# Patient Record
Sex: MALE
[De-identification: names, ages, dates, MRNs, and addresses within clinical notes are randomized per-mention and may not be internally consistent; named-entity substitution may affect disease eponyms.]

---

## 2019-09-10 ENCOUNTER — TRANSCRIPTION ENCOUNTER (OUTPATIENT)
Age: 44
End: 2019-09-10

## 2019-09-16 ENCOUNTER — EMERGENCY (EMERGENCY)
Facility: HOSPITAL | Age: 44
LOS: 1 days | Discharge: HOME | End: 2019-09-16
Attending: EMERGENCY MEDICINE
Payer: COMMERCIAL

## 2019-09-16 VITALS
WEIGHT: 195.11 LBS | HEART RATE: 84 BPM | HEIGHT: 70 IN | DIASTOLIC BLOOD PRESSURE: 81 MMHG | OXYGEN SATURATION: 98 % | TEMPERATURE: 98 F | RESPIRATION RATE: 18 BRPM | SYSTOLIC BLOOD PRESSURE: 164 MMHG

## 2019-09-16 PROBLEM — Z00.00 ENCOUNTER FOR PREVENTIVE HEALTH EXAMINATION: Status: ACTIVE | Noted: 2019-09-16

## 2019-09-16 PROCEDURE — 99283 EMERGENCY DEPT VISIT LOW MDM: CPT

## 2019-09-16 RX ORDER — IBUPROFEN 200 MG
600 TABLET ORAL ONCE
Refills: 0 | Status: COMPLETED | OUTPATIENT
Start: 2019-09-16 | End: 2019-09-16

## 2019-09-16 RX ORDER — LIDOCAINE 4 G/100G
1 CREAM TOPICAL ONCE
Refills: 0 | Status: COMPLETED | OUTPATIENT
Start: 2019-09-16 | End: 2019-09-16

## 2019-09-16 RX ORDER — DIAZEPAM 5 MG
5 TABLET ORAL ONCE
Refills: 0 | Status: DISCONTINUED | OUTPATIENT
Start: 2019-09-16 | End: 2019-09-16

## 2019-09-16 RX ADMIN — Medication 5 MILLIGRAM(S): at 22:31

## 2019-09-16 RX ADMIN — Medication 600 MILLIGRAM(S): at 22:31

## 2019-09-16 RX ADMIN — LIDOCAINE 1 PATCH: 4 CREAM TOPICAL at 23:13

## 2019-09-16 NOTE — ED PROVIDER NOTE - PHYSICAL EXAMINATION
General: in no acute distress  HEENT: PERRLA, EOMI, moist mucous membranes  Neurology: A&Ox3, cn intact, 5/5 strength in upper and lower extremities, straight leg test negative, finger-to-nose intact  Respiratory: CTA B/L, normal respiratory effort, no wheezes, crackles, rales  CV: RRR, S1S2, no murmurs, rubs or gallops  Abdominal: Soft, NT, ND +BS, Last BM  Extremities: No edema, + peripheral pulses General: in no acute distress  HEENT: PERRLA, EOMI, moist mucous membranes  Neurologic: A&Ox3, cn intact, 5/5 strength in upper and lower extremities, , finger-to-nose intact  MSK Back: No midline or paraspinal tenderness. Straight leg test negative. No obvious deformities, swelling, tenderness.   Respiratory: CTA B/L, normal respiratory effort, no wheezes, crackles, rales  CV: RRR, S1S2, no murmurs, rubs or gallops  Abdominal: Soft, NT, ND +BS, Last BM  Extremities: No edema, + 2 peripheral pulses General: in no acute distress  HEENT: PERRLA, EOMI, moist mucous membranes  Neurologic: A&Ox3, cn intact, 5/5 strength in upper and lower extremities, , finger-to-nose intact  MSK Back: No midline or paraspinal tenderness. Straight leg test negative on left. No obvious deformities, swelling, tenderness. 5/5 b/l le, FROM of b/l le. skin intact. no midline thoracic/lumbar spine tenderness.   Respiratory: CTA B/L, normal respiratory effort, no wheezes, crackles, rales  CV: RRR, S1S2, no murmurs, rubs or gallops  Abdominal: Soft, NT, ND +BS, Last BM  Extremities: No edema, + 2 peripheral pulses

## 2019-09-16 NOTE — ED ADULT NURSE NOTE - CHPI ED NUR SYMPTOMS NEG
no tingling/no chills/no weakness/no nausea/no dizziness/no fever/no decreased eating/drinking/no vomiting

## 2019-09-16 NOTE — ED PROVIDER NOTE - CLINICAL SUMMARY MEDICAL DECISION MAKING FREE TEXT BOX
43yo with no PMHx presenting with intermittent severe shooting pain from L buttock to L posterior leg with paresthesia in medial LLE; no urinary/fecal incontinence, no IVDU. Exam shows no focal deficits, straight leg test negative. Likely peripheral process such as sciatica or nerve impingement, unlikely to be central/cord compression. Will try valium/ibuprofen for pain control. 45yo with no PMHx presenting with intermittent severe shooting pain from L buttock to L posterior leg with paresthesia in medial LLE; no urinary/fecal incontinence, no IVDU. Exam shows no focal deficits, straight leg test negative. Likely peripheral process such as sciatica or nerve impingement, unlikely to be central/cord compression. Will try valium/ibuprofen for pain control.  Richard: 44 year old male with left leg pain down the leg. no bowel/bladder incotninence. nof micheline, no ivda, no midline spine tenderness. pain in buttock down leg. 5/5 b/l le, normal gait. will give pain control, valium, reassess

## 2019-09-16 NOTE — ED PROVIDER NOTE - OBJECTIVE STATEMENT
45yo man with no PMHx presenting with shooting pain down back of leg for past 2 weeks. Pt reports that he was carrying his backpack at the time and all of a sudden starting experiencing shooting pain from his L buttock down. The pain is 8/10 when it comes in, occurs in certain positions. Endorses tingling sensation in lateral aspect of LLE and of L 1st tarsal. 1 week ago he went to urgent care who prescribed him Flexeril and Naproxen, which he reports has not helped. He denies urinary or fecal incontinence. He denies HA, fevers, sob, cp, abdominal pain. 45yo man with no PMHx presenting with shooting pain down back of leg for past 2 weeks. Pt reports that he was carrying his backpack at the time and all of a sudden starting experiencing shooting pain from his L buttock down. The pain is 8/10 when it comes in, occurs in certain positions. Endorses tingling sensation in lateral aspect of LLE and of L 1st tarsal. 1 week ago he went to urgent care who prescribed him Flexeril and Naproxen, which he reports has not helped. He has not taken flexeril/naproxen today. He denies urinary or fecal incontinence. He denies HA, fevers, sob, cp, abdominal pain.

## 2019-09-16 NOTE — ED PROVIDER NOTE - ATTENDING CONTRIBUTION TO CARE
I performed a history and physical exam of the patient and discussed their management with the Advanced Care Practitioner. I reviewed the ACP's note and agree with the documented findings and plan of care. My medical decision making and observations are found above.      I performed a history and physical exam of the patient and discussed their management with the Advanced Care Practitioner. I reviewed the ACP's note and agree with the documented findings and plan of care. My medical decision making and observations are found above.

## 2019-09-16 NOTE — ED ADULT NURSE NOTE - NS_NURSE_DISC_ED_ALL_ED_PROVIDEDBY
PATIENT NAME: Danish Melton   MEDICAL RECORD NUMBER: [id number]   ACCOUNT NUMBER: [de-identified]   ADMIT DATE: 10/02/2018   DISCHARGE DATE: ÃÂ    ATTENDING PHYSICIAN: Delonte Nuñez M.D. ORTHOPAEDIC SURGERY   ROOM #: ÃÂ    SERVICE: [facility]                                      OPERATIVE REPORT      DATE OF SURGERY:  10/02/2018      SURGEON:  Delonte Nuñez M.D. PREOPERATIVE DIAGNOSIS:  Spinal stenosis on the right, L3-L4 and L4-L5. PROCEDURE PERFORMED:  Right transforaminal epidural steroid injections, both at   L3-L4 and L4-L5 using fluoroscopic guidance. ANESTHESIA:  Local.      COMPLICATIONS:  There were no complications. The procedure was explained to the patient. She understands that the risks   which included, but are not limited to injury to the dura causing a headache or   a chronic spinal fluid leak, bleeding or infection or possible nerve damage. The patient understands all this and has consented to the procedure to be done   by Dr. Jasper Guillen on today's date. DESCRIPTION OF PROCEDURE:  The patient was brought to the fluoroscopy suite. She was placed in the prone position. All bony prominences were well padded. Her back was prepped and draped in usual sterile manner. A time-out was   performed and all criteria were met appropriately and agreed upon by everyone   in the fluoroscopy suite. First level we did was L3-L4. We identified the   level by counting up from the sacrum. We made sure our C-arm beam was parallel   to the endplates and in the normal rotation. We then rotated 25 degrees from   direct AP from the right side identifying the Scottie dog image on the L3   vertebra, which was the transverse process, pedicle, and inferior facet on that   side.   We then anesthetized the point directly over the Yobani dog mouth with   0.2% ropivacaine. We also anesthetized the deep tissues as far as we could go   with 1.5-inch long needle. We then took a 22-gauge spinal needle that was 3.5   inches long and directed it at the mouth of the Scottie dog down the beam.   Once we had hit bone, we took AP and lateral views confirming we were at the   appropriate level. We had to adjust the needle a couple of times because we   got some blood return when we aspirated at the first 2 locations. At the third   location, there was no blood, and when we injected Omnipaque 240, there was no   vascular pattern. In fact, we first aspirated and got no blood or CSF back. We then injected with Omnipaque 240, which outlined a very nice epidurogram all   the way to and across the midline. We then switched out the syringes and   aspirated the second time making sure there was no blood or CSF that was   produced. We then injected 10 mg of dexamethasone mixed with 2 mL of 0.2%   ropivacaine. We then did exact same procedure at the L4-L5 level, the level   below, also on the right side. This was tighter and more challenging, but we   were able to get the needle in the correct place. Outlined another nice   epidurogram.  We injected another 10 mg of dexamethasone with 2 mL of 0.2%   ropivacaine. At no time, did we get a return of any of the aspirations we did   and at no time was our vascular pattern. All the needles were removed. The back was cleaned. Band-Aids were applied. The patient was able to walk under her own power back to the holding area and   was discharged. Discharge instructions were given. Follow up will be as   scheduled. _________________________________   Guerita Zepeda M.D.  ORTHOPAEDIC SURGERY         CC:          Guerita Zepeda M.D. JP/SHUKRI   DD: 10/02/2018  DT: 10/02/2018   TD: 12:01  TT: 19:01   [id number] ED MD

## 2019-09-16 NOTE — ED PROVIDER NOTE - PATIENT PORTAL LINK FT
You can access the FollowMyHealth Patient Portal offered by [facility] by registering at the following website: [email or web address]. By joining [employer]’s FollowMyHealth portal, you will also be able to view your health information using other applications (apps) compatible with our system.

## 2019-09-16 NOTE — ED PROVIDER NOTE - NS ED ROS FT
CONSTITUTIONAL: No weakness, fevers or chills  EYES/ENT: No visual changes;  No vertigo or throat pain   NECK: No pain or stiffness  RESPIRATORY: No cough, wheezing, hemoptysis; No shortness of breath  CARDIOVASCULAR: No chest pain or palpitations  GASTROINTESTINAL: No abdominal or epigastric pain. No nausea, vomiting, or hematemesis; No diarrhea or constipation. No melena or hematochezia.  GENITOURINARY: No dysuria, frequency or hematuria  NEUROLOGICAL: per HPI  SKIN: No itching, rashes

## 2019-09-16 NOTE — ED ADULT NURSE NOTE - OBJECTIVE STATEMENT
45 yo M w/ no PMHx presents to ED from home c/o pain from low back radiating down L leg. Pt states pain is sharp and shooting, been present over past 2 weeks. Intermittent, 8/10 when present. States he also feels tingling down L leg w/ pain. Denies recent falls or trauma. Went to urgent care last week and was prescribed flexeril and naproxen, states no improvement. Denies weakness of extremities, strong and equal b/l. +PMSx4. Pt denies any CP, SOB, N/V, fever, chills, urinary complaints, constipation, diarrhea, HA, dizziness, weakness. Pt A&Ox4, lungs CTA, +central pulses. Abdomen soft, not tender, not distended. Ambulating w/ steady gait, safety and comfort maintained, no acute distress noted at this time.

## 2019-09-16 NOTE — ED ADULT NURSE NOTE - CAS DISCH TRANSFER METHOD
-- Message is from the [facility]--    Patient is requesting a medication refill - the medication was not listed on the patient's active medication list.    Prescribing Provider: Elsie Keith    RX Name:  tricinolone acetonide ointment usp 0.025%  Dose: usp 0.025%  Quantity:  n/a  Instruction(s):  n/a    Duration: n/a days    Pharmacy  [facility] - [place], [place] - [address] [facility] [address]    Patient confirmed the above pharmacy as correct?  Yes    Caller Information       Type Contact Phone    07/16/2019 07:49 AM Phone (Incoming) Toya Noguera (Self) [phone] (M)          Alternative phone number: n/a    Turnaround time given to caller:   \"This message will be sent to [state Provider's name]. The clinical team will fulfill your request as soon as they review your message.\"  
-- Message is from the [facility]--    Patient is requesting a medication refill - the medication was not listed on the patient's active medication list.    Prescribing Provider: Elsie Keith    Script is needed. Cannot get over the counter.     RX Name:  Hydrocortizone  Dose:  2%  Quantity:  n/a  Instruction(s):  n/a    Duration: n/a days    Pharmacy  [facility] - [place], [place] - [address] [facility] [address]    Patient confirmed the above pharmacy as correct?  Yes    Caller Information       Type Contact Phone    07/16/2019 07:49 AM Phone (Incoming) Toya Noguera (Self) [phone] (M)          Alternative phone number: n/a    Turnaround time given to caller:   \"This message will be sent to [state Provider's name]. The clinical team will fulfill your request as soon as they review your message.\"  
Medication has been sent to pharmacy   
Private car

## 2019-09-16 NOTE — ED ADULT TRIAGE NOTE - CHIEF COMPLAINT QUOTE
Pain to left hip radiating down left leg, since last week. +numbness to left foot.  PCP, prescribed Flexeril and Naproxen with no relief.  Pain is worse now with difficulty walking due to pain.

## 2019-09-16 NOTE — ED PROVIDER NOTE - NSFOLLOWUPINSTRUCTIONS_ED_ALL_ED_FT
1. Follow up with your primary care provider with your pre-scheduled Friday appointment.    2. For pain control, continue to take Flexuril and Naproxen as prescribed.    3. Continue daily activities as tolerated, and avoid bed rest as this may worsen symptoms.    4. Return to ED for worsening pain, urinary retention, urinary incontinence, numbness in your groin, fever, chills, or any other concerning symptoms. 1. Please follow up with our sports medicine clinic within 1-3 days. See attached form for information.    Also follow up with your primary care provider with your pre-scheduled Friday appointment.    2. For pain control, continue to take Flexuril and Naproxen as prescribed.    3. Continue daily activities as tolerated, and avoid bed rest as this may worsen symptoms.    4. Return to ED for worsening pain, urinary retention, urinary incontinence, numbness in your groin, fever, chills, or any other concerning symptoms.

## 2019-09-17 ENCOUNTER — APPOINTMENT (OUTPATIENT)
Dept: SPORTS MEDICINE | Facility: CLINIC | Age: 44
End: 2019-09-17
Payer: COMMERCIAL

## 2019-09-17 DIAGNOSIS — Z78.9 OTHER SPECIFIED HEALTH STATUS: ICD-10-CM

## 2019-09-17 PROCEDURE — 99205 OFFICE O/P NEW HI 60 MIN: CPT

## 2019-09-17 RX ORDER — NAPROXEN 500 MG/1
TABLET ORAL
Refills: 0 | Status: ACTIVE | COMMUNITY

## 2019-09-17 NOTE — PHYSICAL EXAM
[Normal RLE] : Right Lower Extremity: No scars, rashes, lesions, ulcers, skin intact [Normal LLE] : Left Lower Extremity: No scars, rashes, lesions, ulcers, skin intact [Normal DTR Reflexes] : DTR reflexes normal [Normal] : No costovertebral angle tenderness and no spinal tenderness [de-identified] : Attending note. Patient is alert and in no acute distress. Patient has no palpable tenderness in the lumbar spine. He has tenderness in the left sciatic notch. Examination of the back reveals no rashes or lesions. Seated straight leg raise is positive on the left. Motor strength is 5/5 equal and symmetrical, however patient has a mild weakness on dorsiflexion of the left foot. Patient is not able to heel walk on the left foot. DTRs are +1/4 equal and symmetrical. There is no clonus. Patient has paresthesia on the lateral left ankle and medial left foot. There is no leg edema. Distal pulses are intact. Slump test is positive. [de-identified] : No midline spinal tenderness, negative straight leg raise, 5/5 motor strength with flexion/extension at hip, 5/5 knee flexion and extension, 5/5 dorsifelxion/plantarfelxion, subjective numbness to medial L foot

## 2019-09-17 NOTE — HISTORY OF PRESENT ILLNESS
[de-identified] : 44 male presents with lower back pain and paresthesias to his L leg X 2 weeks. 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 He states he has had intermittent shooting pain radiating from the posterior buttock down the posterior part of his lower extremity , and the outside of his L foot. he denies fevers, nausea, vomiting. No urinary incontinence, bowel loss, or saddle numbness. He continues to be able to ambulate without difficulty,. Patient went urgent care and Ed yesterday receiving pain medication and lidocaine patch due to increase pain, that has now resolved. \par    Attending note. This is a new patient visit for this 44-year-old . Patient is complaining of gradual onset of low back pain approximately 2 weeks ago. Sharp and in the left lower back with radiation down the left leg. Patient reports numbness on the lateral aspect of the lower shin and medial aspect of the foot. He denies any bowel or bladder dysfunction. He denies any saddle anesthesia, fever or previous back injury. Pain is exacerbated by certain movements and lifting heavy objects. He was seen at urgent care center and prescribed Naprosyn and a muscle relaxant. Patient has continued to go to work until this week.

## 2019-09-17 NOTE — DISCUSSION/SUMMARY
[de-identified] : 44 male with lower back pain with LLE sciatica \par No emergent signs or symptoms\par Pain well controlled \par Physical exam, neurologically intact, ambulating well\par \par Will obtain MRI of lumbar spine without contrast to evaluate, provide physical therapy, and followup with spine specalist. \par      Attending note. Impression: Low back pain with sciatica. Patient has been taking naproxen without relief. Patient also complains of worsening paresthesia on the left lateral ankle and medial left foot. The patient was provided with a prescription for physical therapy as well as prescription for MRI of the lumbar spine to rule out disc herniation as paresthesia may be worsening and patient has a left foot drop. Referral to a spine specialist Dr. Perales.

## 2019-09-20 ENCOUNTER — APPOINTMENT (OUTPATIENT)
Dept: PHYSICAL MEDICINE AND REHAB | Facility: CLINIC | Age: 44
End: 2019-09-20
